# Patient Record
Sex: MALE | Race: BLACK OR AFRICAN AMERICAN | Employment: STUDENT | URBAN - METROPOLITAN AREA
[De-identification: names, ages, dates, MRNs, and addresses within clinical notes are randomized per-mention and may not be internally consistent; named-entity substitution may affect disease eponyms.]

---

## 2022-05-03 ENCOUNTER — APPOINTMENT (OUTPATIENT)
Dept: GENERAL RADIOLOGY | Age: 22
End: 2022-05-03
Attending: NURSE PRACTITIONER
Payer: COMMERCIAL

## 2022-05-03 ENCOUNTER — HOSPITAL ENCOUNTER (EMERGENCY)
Age: 22
Discharge: HOME | End: 2022-05-03
Payer: COMMERCIAL

## 2022-05-03 VITALS
HEIGHT: 66 IN | WEIGHT: 165 LBS | HEART RATE: 88 BPM | TEMPERATURE: 98.6 F | SYSTOLIC BLOOD PRESSURE: 139 MMHG | RESPIRATION RATE: 18 BRPM | DIASTOLIC BLOOD PRESSURE: 87 MMHG | OXYGEN SATURATION: 100 % | BODY MASS INDEX: 26.52 KG/M2

## 2022-05-03 DIAGNOSIS — S60.922A: ICD-10-CM

## 2022-05-03 DIAGNOSIS — S89.92XA LEFT KNEE INJURY, INITIAL ENCOUNTER: ICD-10-CM

## 2022-05-03 DIAGNOSIS — T07.XXXA ABRASIONS OF MULTIPLE SITES: Primary | ICD-10-CM

## 2022-05-03 PROCEDURE — 73562 X-RAY EXAM OF KNEE 3: CPT

## 2022-05-03 PROCEDURE — 74011250636 HC RX REV CODE- 250/636: Performed by: NURSE PRACTITIONER

## 2022-05-03 PROCEDURE — 90471 IMMUNIZATION ADMIN: CPT

## 2022-05-03 PROCEDURE — 99284 EMERGENCY DEPT VISIT MOD MDM: CPT

## 2022-05-03 PROCEDURE — 74011250637 HC RX REV CODE- 250/637: Performed by: NURSE PRACTITIONER

## 2022-05-03 PROCEDURE — 73130 X-RAY EXAM OF HAND: CPT

## 2022-05-03 PROCEDURE — 90715 TDAP VACCINE 7 YRS/> IM: CPT | Performed by: NURSE PRACTITIONER

## 2022-05-03 RX ORDER — BACITRACIN 500 [USP'U]/G
OINTMENT TOPICAL 3 TIMES DAILY
Qty: 1 EACH | Refills: 0 | Status: SHIPPED | OUTPATIENT
Start: 2022-05-03 | End: 2022-05-13

## 2022-05-03 RX ORDER — ACETAMINOPHEN 325 MG/1
650 TABLET ORAL ONCE
Status: COMPLETED | OUTPATIENT
Start: 2022-05-03 | End: 2022-05-03

## 2022-05-03 RX ADMIN — TETANUS TOXOID, REDUCED DIPHTHERIA TOXOID AND ACELLULAR PERTUSSIS VACCINE, ADSORBED 0.5 ML: 5; 2.5; 8; 8; 2.5 SUSPENSION INTRAMUSCULAR at 17:59

## 2022-05-03 RX ADMIN — ACETAMINOPHEN 650 MG: 325 TABLET ORAL at 17:59

## 2022-05-03 NOTE — ED PROVIDER NOTES
EMERGENCY DEPARTMENT HISTORY AND PHYSICAL EXAM      Date: 5/3/2022  Patient Name: Dottie Cunningham    History of Presenting Illness     Chief Complaint   Patient presents with   Potter Fall     from longboard       History Provided By: Patient    HPI: Dottie Cunningham, 24 y.o. male with a past medical history significant for asthma presents to the ED with cc of left hand and left knee pain and multiple abrasions after falling off of his longboard 10 days ago after running over a rock and losing his balance. Initial impact to left hand and left knee followed by sliding and rolling causing abrasions to right upper extremity on the lateral side from shoulder to wrist and abrasions to his left dorsal hand and left knee. He has been cleaning them daily and using ibuprofen and aspirin alternately for any musculoskeletal pain with improvement. He was instructed to come to the ER for evaluation by his mother and grandmother who came to visit him yesterday. He has full range of motion, has been able to perform on the drum line without difficulty, ambulates with steady gait, only complains of left knee stiffness after standing or walking long distances with spontaneous resolution. Denies any other injury, head injury, loss of consciousness, fever, chills, body aches. There are no other complaints, changes, or physical findings at this time. PCP: Ely Mcdermott MD    No current facility-administered medications on file prior to encounter. No current outpatient medications on file prior to encounter. Past History     Past Medical History:  Past Medical History:   Diagnosis Date    Asthma        Past Surgical History:  History reviewed. No pertinent surgical history. Family History:  History reviewed. No pertinent family history. Social History:  Social History     Tobacco Use    Smoking status: Current Some Day Smoker    Smokeless tobacco: Never Used   Substance Use Topics    Alcohol use: Never    Drug use:  Yes Types: Marijuana       Allergies: Allergies   Allergen Reactions    Nut - Unspecified Unknown (comments)         Review of Systems     Review of Systems   Constitutional: Negative. Negative for appetite change, fatigue and fever. HENT: Negative. Eyes: Negative. Respiratory: Negative. Negative for cough and shortness of breath. Cardiovascular: Negative. Gastrointestinal: Negative. Negative for abdominal pain and nausea. Genitourinary: Negative. Musculoskeletal: Positive for arthralgias. Negative for back pain, gait problem, joint swelling, myalgias and neck pain. Skin: Positive for wound. Neurological: Negative. Negative for dizziness and headaches. Hematological: Negative. Psychiatric/Behavioral: Negative. All other systems reviewed and are negative. Physical Exam     Physical Exam  Vitals and nursing note reviewed. Constitutional:       General: He is not in acute distress. Appearance: Normal appearance. He is normal weight. He is not ill-appearing, toxic-appearing or diaphoretic. HENT:      Head: Normocephalic and atraumatic. Right Ear: External ear normal.      Left Ear: External ear normal.      Nose: Nose normal.   Eyes:      Conjunctiva/sclera: Conjunctivae normal.   Cardiovascular:      Rate and Rhythm: Normal rate. Pulses: Normal pulses. Pulmonary:      Effort: Pulmonary effort is normal. No respiratory distress. Abdominal:      Palpations: Abdomen is soft. Tenderness: There is no abdominal tenderness. Musculoskeletal:         General: Tenderness and signs of injury present. No swelling. Normal range of motion. Right shoulder: No swelling, deformity, laceration, tenderness, bony tenderness or crepitus. Normal range of motion. Left shoulder: Normal.      Right wrist: Normal.      Left wrist: Normal.      Right hand: Normal.      Left hand: Tenderness present. No swelling, deformity, lacerations or bony tenderness.  Normal range of motion. Normal strength. Normal sensation. Normal capillary refill. Normal pulse. Hands:       Cervical back: Normal range of motion and neck supple. No tenderness. Right upper leg: Normal.      Left upper leg: Normal.      Right knee: Normal.      Left knee: Bony tenderness present. No swelling, deformity, erythema, ecchymosis or crepitus. Normal range of motion. Normal alignment and normal patellar mobility. Normal pulse. Right lower leg: Normal. No edema. Left lower leg: Normal. No edema. Legs:    Skin:     General: Skin is warm and dry. Capillary Refill: Capillary refill takes less than 2 seconds. Neurological:      General: No focal deficit present. Mental Status: He is alert and oriented to person, place, and time. Cranial Nerves: No cranial nerve deficit. Sensory: No sensory deficit. Gait: Gait normal.   Psychiatric:         Mood and Affect: Mood normal.         Behavior: Behavior normal.         Lab and Diagnostic Study Results     Labs -   No results found for this or any previous visit (from the past 12 hour(s)). Radiologic Studies -   @lastxrresult@  CT Results  (Last 48 hours)    None        CXR Results  (Last 48 hours)    None        XR HAND LT MIN 3 V   Final Result   No acute bony findings. XR KNEE LT 3 V   Final Result   1. No acute bony findings. 2. Anterior soft tissue swelling. Medical Decision Making   - I am the first provider for this patient. - I reviewed the vital signs, available nursing notes, past medical history, past surgical history, family history and social history. - Initial assessment performed. The patients presenting problems have been discussed, and they are in agreement with the care plan formulated and outlined with them. I have encouraged them to ask questions as they arise throughout their visit. Vital Signs-Reviewed the patient's vital signs.   Patient Vitals for the past 12 hrs:   Temp Pulse Resp BP SpO2   05/03/22 1721 98.6 °F (37 °C) 88 18 139/87 100 %       Records Reviewed: Nursing Notes and Old Medical Records    The patient presents with abrasions, left hand pain, left knee pain with a differential diagnosis of contusion, fracture, dislocation, arthritis, cellulitis, joint effusion      ED Course:   24-year-old otherwise healthy male presents with injuries sustained from a fall off of his long board 10 days ago after being told to come by his mother and grandmother. He has been performing appropriately described wound care and has managed his musculoskeletal pain with Tylenol and ibuprofen with success at appropriate dosages. Imaging performed of hand and knee due to initial impact to those areas and patient's request without any evidence of fracture or dislocation or abnormality. No evidence of cellulitis or infection surrounding the abrasions. Nonadhering dressing applied to left knuckles. There is no drainage. He has full range of motion without evidence of joint effusions or abnormalities on exam.  He was agreeable with discharge to follow-up with his PCP and was provided with a prescription for bacitracin for any skin care for the abrasions. They are superficial without involvement of multiple layers of skin and show good healing. Updated tetanus shot as last was in 2012. No evidence of systemic infection or cellulitis or fever requiring antibiotics. He was educated on signs and symptoms to return for which he verbalized understanding.        Provider Notes (Medical Decision Making):     MDM  Number of Diagnoses or Management Options  Abrasions of multiple sites: new, needed workup  Left knee injury, initial encounter: new, needed workup  Superficial injury of left hand, initial encounter: new, needed workup     Amount and/or Complexity of Data Reviewed  Tests in the radiology section of CPT®: ordered and reviewed  Discuss the patient with other providers: yes  Independent visualization of images, tracings, or specimens: yes    Risk of Complications, Morbidity, and/or Mortality  Presenting problems: minimal  Diagnostic procedures: minimal  Management options: minimal    Patient Progress  Patient progress: stable           Disposition   Disposition: Condition stable  DC- Adult Discharges: All of the diagnostic tests were reviewed and questions answered. Diagnosis, care plan and treatment options were discussed. The patient understands the instructions and will follow up as directed. The patients results have been reviewed with them. They have been counseled regarding their diagnosis. The patient verbally convey understanding and agreement of the signs, symptoms, diagnosis, treatment and prognosis and additionally agrees to follow up as recommended with their PCP in 24 - 48 hours. They also agree with the care-plan and convey that all of their questions have been answered. I have also put together some discharge instructions for them that include: 1) educational information regarding their diagnosis, 2) how to care for their diagnosis at home, as well a 3) list of reasons why they would want to return to the ED prior to their follow-up appointment, should their condition change. Discharged    DISCHARGE PLAN:  1. There are no discharge medications for this patient. 2.   Follow-up Information     Follow up With Specialties Details Why Contact Info    Kandis Vega MD Pediatric Medicine In 3 days  [address]  [phone]      Follow up with primary care, urgent care, or this Emergency department   As needed, If symptoms worsen         3. Return to ED if worse   4. Discharge Medication List as of 5/3/2022  6:28 PM            Diagnosis     Clinical Impression:   1. Abrasions of multiple sites    2. Left knee injury, initial encounter    3.  Superficial injury of left hand, initial encounter Attestations:    Zina Chavarria NP    Please note that this dictation was completed with [employer], the computer voice recognition software. Quite often unanticipated grammatical, syntax, homophones, and other interpretive errors are inadvertently transcribed by the computer software. Please disregard these errors. Please excuse any errors that have escaped final proofreading. Thank you.

## 2022-05-03 NOTE — ED TRIAGE NOTES
Left knee and left hand pain after falling off longboard last weekend.    Abrasions to right arm and right shoulder post-fall